# Patient Record
Sex: MALE | Race: WHITE | NOT HISPANIC OR LATINO | Employment: STUDENT | URBAN - METROPOLITAN AREA
[De-identification: names, ages, dates, MRNs, and addresses within clinical notes are randomized per-mention and may not be internally consistent; named-entity substitution may affect disease eponyms.]

---
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
## 2025-03-28 ENCOUNTER — HOSPITAL ENCOUNTER (EMERGENCY)
Facility: HOSPITAL | Age: 20
Discharge: HOME | End: 2025-03-28
Payer: COMMERCIAL

## 2025-03-28 VITALS
OXYGEN SATURATION: 96 % | BODY MASS INDEX: 35.36 KG/M2 | RESPIRATION RATE: 16 BRPM | SYSTOLIC BLOOD PRESSURE: 132 MMHG | DIASTOLIC BLOOD PRESSURE: 79 MMHG | HEIGHT: 66 IN | WEIGHT: 220 LBS | TEMPERATURE: 96.8 F | HEART RATE: 70 BPM

## 2025-03-28 DIAGNOSIS — R10.13 EPIGASTRIC PAIN: Primary | ICD-10-CM

## 2025-03-28 DIAGNOSIS — K29.00 ACUTE GASTRITIS WITHOUT HEMORRHAGE, UNSPECIFIED GASTRITIS TYPE: ICD-10-CM

## 2025-03-28 PROCEDURE — 2500000004 HC RX 250 GENERAL PHARMACY W/ HCPCS (ALT 636 FOR OP/ED)

## 2025-03-28 PROCEDURE — 99282 EMERGENCY DEPT VISIT SF MDM: CPT | Mod: 25

## 2025-03-28 PROCEDURE — 2500000001 HC RX 250 WO HCPCS SELF ADMINISTERED DRUGS (ALT 637 FOR MEDICARE OP)

## 2025-03-28 RX ORDER — SUCRALFATE 1 G/10ML
1 SUSPENSION ORAL 4 TIMES DAILY
Qty: 400 ML | Refills: 0 | Status: SHIPPED | OUTPATIENT
Start: 2025-03-28 | End: 2025-03-28

## 2025-03-28 RX ORDER — FAMOTIDINE 20 MG/1
20 TABLET, FILM COATED ORAL 2 TIMES DAILY
Qty: 20 TABLET | Refills: 0 | Status: SHIPPED | OUTPATIENT
Start: 2025-03-28 | End: 2025-04-07

## 2025-03-28 RX ORDER — FAMOTIDINE 20 MG/1
20 TABLET, FILM COATED ORAL 2 TIMES DAILY
Qty: 20 TABLET | Refills: 0 | Status: SHIPPED | OUTPATIENT
Start: 2025-03-28 | End: 2025-03-28

## 2025-03-28 RX ORDER — FAMOTIDINE 20 MG/1
20 TABLET, FILM COATED ORAL ONCE
Status: COMPLETED | OUTPATIENT
Start: 2025-03-28 | End: 2025-03-28

## 2025-03-28 RX ORDER — ONDANSETRON 4 MG/1
4 TABLET, ORALLY DISINTEGRATING ORAL EVERY 8 HOURS PRN
Qty: 20 TABLET | Refills: 0 | Status: SHIPPED | OUTPATIENT
Start: 2025-03-28 | End: 2025-04-04

## 2025-03-28 RX ORDER — ONDANSETRON 4 MG/1
4 TABLET, ORALLY DISINTEGRATING ORAL EVERY 8 HOURS PRN
Qty: 20 TABLET | Refills: 0 | Status: SHIPPED | OUTPATIENT
Start: 2025-03-28 | End: 2025-03-28

## 2025-03-28 RX ORDER — ONDANSETRON 4 MG/1
4 TABLET, ORALLY DISINTEGRATING ORAL ONCE
Status: COMPLETED | OUTPATIENT
Start: 2025-03-28 | End: 2025-03-28

## 2025-03-28 RX ORDER — SUCRALFATE 1 G/10ML
1 SUSPENSION ORAL 4 TIMES DAILY
Qty: 400 ML | Refills: 0 | Status: SHIPPED | OUTPATIENT
Start: 2025-03-28 | End: 2025-04-07

## 2025-03-28 RX ADMIN — ONDANSETRON 4 MG: 4 TABLET, ORALLY DISINTEGRATING ORAL at 07:23

## 2025-03-28 RX ADMIN — FAMOTIDINE 20 MG: 20 TABLET, FILM COATED ORAL at 07:23

## 2025-03-28 ASSESSMENT — COLUMBIA-SUICIDE SEVERITY RATING SCALE - C-SSRS
6. HAVE YOU EVER DONE ANYTHING, STARTED TO DO ANYTHING, OR PREPARED TO DO ANYTHING TO END YOUR LIFE?: NO
2. HAVE YOU ACTUALLY HAD ANY THOUGHTS OF KILLING YOURSELF?: NO
1. IN THE PAST MONTH, HAVE YOU WISHED YOU WERE DEAD OR WISHED YOU COULD GO TO SLEEP AND NOT WAKE UP?: NO

## 2025-03-28 ASSESSMENT — PAIN - FUNCTIONAL ASSESSMENT: PAIN_FUNCTIONAL_ASSESSMENT: 0-10

## 2025-03-28 ASSESSMENT — PAIN SCALES - GENERAL: PAINLEVEL_OUTOF10: 0 - NO PAIN

## 2025-03-28 NOTE — Clinical Note
Seth Huerta was seen and treated in our emergency department on 3/28/2025.  He may return to work on 03/31/2025.       If you have any questions or concerns, please don't hesitate to call.      Dinesh Borges PA-C

## 2025-03-28 NOTE — Clinical Note
Seth Huerta was seen and treated in our emergency department on 3/28/2025.  He may return to school on 03/31/2025.      If you have any questions or concerns, please don't hesitate to call.      Dinesh Borges PA-C

## 2025-03-28 NOTE — ED PROVIDER NOTES
"HPI   Chief Complaint   Patient presents with    Abdominal Pain     X3day, burning \"like a stomach acid\", intermittent, denies pain now       History provided by: Patient    Limitations to history: None    CC: Abdominal pain    HPI: 19-year-old male previously healthy presents the emergency department to be evaluated for abdominal pain.  Patient states for the last few days he has had burning pain in his epigastric region.  Patient has a history of gastritis however has not had a flareup in several weeks, states it feels similar to his previous exacerbations.  He denies taking any medications for gastritis.  He states that the pain will often make him feel nauseous.  Patient has not had any pain or nausea since yesterday but wanted to be evaluated.  Denies urgency, frequency, dysuria.  Denies blood in urine or stool.  Denies diarrhea or constipation.  Denies fever chills or any flulike symptoms.  Denies chest pain or shortness of breath.  The pain is there whenever he eats or drinks anything.  He has never required an EGD.  Denies all other systemic symptoms.    ROS: Negative unless mentioned in HPI    Medical Hx: Allergy to bee pollen.  Immunizations up-to-date.    Physical exam:    Constitutional: Patient is well-nourished and well-developed.  Sitting comfortably in the room and in no distress.  Oriented to person, place, time, and situation.    HEENT: Head is normocephalic, atraumatic. Patient's airway is patent.  Tympanic membranes are clear bilaterally.  Nasal mucosa clear.  Mouth with normal mucosa.  Throat is not erythematous and there are no oropharyngeal exudates, uvula is midline.  No obvious facial deformities.    Eyes: Clear bilaterally.  Pupils are equal round and reactive to light and accommodation.  Extraocular movements intact.      Cardiac: Regular rate, regular rhythm.  Heart sounds S1, S2.  No murmurs, rubs, or gallops.  PMI nondisplaced.  No JVD.    Respiratory: Regular respiratory rate and " effort.  Breath sounds are clear and equal bilaterally, no adventitious lung sounds.  Patient is speaking in full sentences and is in no apparent respiratory distress. No use of accessory muscles.      Gastrointestinal: Abdomen is soft, nondistended, and nontender.  Negative De La Cruz sign.  No pain McBurney point.  Negative Rovsing sign.  Negative psoas and obturator sign.  There are no obvious deformities.  No rebound tenderness or guarding.  Bowel sounds are normal active.    Genitourinary: No CVA or flank tenderness.    Musculoskeletal: No reproducible tenderness.  No obvious skin or bony deformities.  Patient has equal range of motion in all extremities and no strength deficiencies.  No muscle or joint tenderness. No back or neck tenderness.  Capillary refill less than 3 seconds.  Strong peripheral pulses.  No sensory deficits.    Neurological: Patient is alert and oriented.  No focal deficits.  5/5 strength in all extremities.  Cranial nerves II through XII intact. GCS15.     Skin: Skin is normal color for race and is warm, dry, and intact.  No evidence of trauma.  No lesions, rashes, bruising, jaundice, or masses.    Psych: Appropriate mood and affect.  No apparent risk to self or others.    Heme/lymph: No adenopathy, lymphadenopathy, or splenomegaly    Physical exam is otherwise negative unless stated above or in history of present illness.              Patient History   Past Medical History:   Diagnosis Date    Bitten or stung by nonvenomous insect and other nonvenomous arthropods, initial encounter 09/21/2018    Bug bites    Hypertrophy of tonsils with hypertrophy of adenoids 12/30/2015    Hypertrophy of tonsil and adenoid    Melena 02/20/2019    Blood in stool    Obstructive sleep apnea (adult) (pediatric) 12/30/2015    Obstructive sleep apnea    Other conditions influencing health status 01/03/2015    Leg sprain    Other specified respiratory disorders 11/11/2015    Wheezing-associated respiratory infection     Pain in left foot 02/27/2018    Left foot pain    Pain in unspecified knee 02/10/2015    Knee pain    Pain in unspecified lower leg 01/03/2015    Lower leg pain    Personal history of diseases of the skin and subcutaneous tissue 09/21/2018    History of impetigo    Personal history of other diseases of the respiratory system     Personal history of asthma    Personal history of other infectious and parasitic diseases 05/11/2016    History of pinworm infection    Unspecified injury of left lower leg, initial encounter 09/01/2016    Injury of left knee, initial encounter     Past Surgical History:   Procedure Laterality Date    OTHER SURGICAL HISTORY  11/04/2014    Surgery Testis Orchiopexy Left    TONSILLECTOMY  12/30/2015    Tonsillectomy With Adenoidectomy     No family history on file.  Social History     Tobacco Use    Smoking status: Never     Passive exposure: Current    Smokeless tobacco: Never   Substance Use Topics    Alcohol use: Never    Drug use: Never       Physical Exam   ED Triage Vitals [03/28/25 0717]   Temperature Heart Rate Respirations BP   36 °C (96.8 °F) 70 16 132/79      Pulse Ox Temp src Heart Rate Source Patient Position   96 % -- -- --      BP Location FiO2 (%)     -- --       Physical Exam      ED Course & MDM   Diagnoses as of 03/28/25 0721   Epigastric pain   Acute gastritis without hemorrhage, unspecified gastritis type          Patient updated on plan for lab testing, IV insertion, radiology imaging, and medications to be administered while in the ER (if indicated). Patient updated on expected wait times for testing and results. Patient provided my name and told to ask any staff member for questions or concerns if they should arise. Electronic medical record reviewed.     [facility]    Patient presented to the emergency department with the chief complaint abdominal pain.  Abdomen is soft, nondistended, and nontender.  Negative De La Cruz sign.  No pain McBurney point.  Negative Rovsing sign.   Negative psoas and obturator sign.  There are no obvious deformities.  No rebound tenderness or guarding.  Bowel sounds are normal active.  Examination of the patient's heart and lungs are unremarkable.  On arrival to the emergency department, vital signs were within normal limits    Based on the patient's history and physical exam of a low suspicion for appendicitis, diverticulitis, small bowel obstruction, cholecystitis.  Low suspicion for pancreatitis given the patient's age.  I do believe the patient is likely experiencing gastritis versus gastric or duodenal ulcers.  Will withhold blood work or imaging given that the patient is not having any pain at this time and this would not change my plan.  Will start the patient on Pepcid Carafate and Zofran.  I will give him a referral to follow-up with GI.  I discussed supportive treatment including lifestyle and food/beverage adjustments.  He will follow-up with his primary care provider.  All questions and concerns addressed.  Reasons to return to ER discussed.  Patient verbalized understanding and agreement with the treatment plan and they remained hemodynamically stable in the ER.    This note was dictated using a speech recognition program.  While an attempt was made at proof-reading to minimize errors, minor errors in transcription may be present       No data recorded     Cash Coma Scale Score: 15 (03/28/25 0719 : Deena Rinaldi RN)                           Medical Decision Making      Procedure  Procedures     Dinesh Borges PA-C  03/28/25 0766

## 2025-06-11 ENCOUNTER — OFFICE VISIT (OUTPATIENT)
Dept: GASTROENTEROLOGY | Facility: CLINIC | Age: 20
End: 2025-06-11
Payer: COMMERCIAL

## 2025-06-11 VITALS
HEART RATE: 80 BPM | WEIGHT: 234 LBS | BODY MASS INDEX: 37.61 KG/M2 | SYSTOLIC BLOOD PRESSURE: 109 MMHG | HEIGHT: 66 IN | DIASTOLIC BLOOD PRESSURE: 69 MMHG | TEMPERATURE: 98.6 F

## 2025-06-11 DIAGNOSIS — R10.13 EPIGASTRIC PAIN: ICD-10-CM

## 2025-06-11 PROCEDURE — 99203 OFFICE O/P NEW LOW 30 MIN: CPT | Performed by: NURSE PRACTITIONER

## 2025-06-11 PROCEDURE — 1036F TOBACCO NON-USER: CPT | Performed by: NURSE PRACTITIONER

## 2025-06-11 PROCEDURE — 99202 OFFICE O/P NEW SF 15 MIN: CPT | Performed by: NURSE PRACTITIONER

## 2025-06-11 PROCEDURE — 3008F BODY MASS INDEX DOCD: CPT | Performed by: NURSE PRACTITIONER

## 2025-06-11 ASSESSMENT — ENCOUNTER SYMPTOMS
ALLERGIC/IMMUNOLOGIC NEGATIVE: 1
DIAPHORESIS: 0
WHEEZING: 0
STRIDOR: 0
SHORTNESS OF BREATH: 0
PSYCHIATRIC NEGATIVE: 1
DIFFICULTY URINATING: 0
CHEST TIGHTNESS: 0
NEUROLOGICAL NEGATIVE: 1
FEVER: 0
CHILLS: 0
ROS GI COMMENTS: SEE HPI
CARDIOVASCULAR NEGATIVE: 1
APNEA: 0
RESPIRATORY NEGATIVE: 1
COUGH: 0
EYES NEGATIVE: 1
ENDOCRINE NEGATIVE: 1
HEMATOLOGIC/LYMPHATIC NEGATIVE: 1
FATIGUE: 0
MUSCULOSKELETAL NEGATIVE: 1

## 2025-06-11 NOTE — PROGRESS NOTES
Subjective   Patient ID: Seth Huerta is a 19 y.o. male who presents for ER Follow-up.    Present today for ER follow up  No longer having vomiting or pain  No longer using medication    He denies heartburn, dysphagia, odynophagia, melena,vomiting, diarrhea, weight loss, night sweats     Family Hx: Denies a family hx of CRC, GI cancers  Denies marijuana use     Review of Systems   Constitutional:  Negative for chills, diaphoresis, fatigue and fever.   HENT: Negative.     Eyes: Negative.    Respiratory: Negative.  Negative for apnea, cough, chest tightness, shortness of breath, wheezing and stridor.    Cardiovascular: Negative.    Gastrointestinal:         See HPI    Endocrine: Negative.    Genitourinary: Negative.  Negative for difficulty urinating.   Musculoskeletal: Negative.    Skin: Negative.    Allergic/Immunologic: Negative.    Neurological: Negative.    Hematological: Negative.    Psychiatric/Behavioral: Negative.         Objective   Physical Exam  Constitutional:       Appearance: Normal appearance.   HENT:      Nose: Nose normal.   Eyes:      General: Lids are normal.   Cardiovascular:      Rate and Rhythm: Normal rate and regular rhythm.      Heart sounds: Normal heart sounds.   Pulmonary:      Effort: Pulmonary effort is normal.      Breath sounds: Normal breath sounds.   Abdominal:      General: Bowel sounds are normal.   Musculoskeletal:         General: Normal range of motion.   Skin:     General: Skin is warm and dry.   Neurological:      Mental Status: He is alert and oriented to person, place, and time.   Psychiatric:         Mood and Affect: Mood normal.         Assessment/Plan   Diagnoses and all orders for this visit:  Epigastric pain  - resolved       19 year old male with no significant PMH who presents today ER follow-up. Seen in the ER on 3/2025 for epigastric pain which has now resolved after a 10-day course of PPI. He is without symptoms however will return to GI to consider EGD if  symptoms return.       Maru Atkinson, TYSON-CNP 06/11/25 3:13 PM